# Patient Record
Sex: FEMALE | Race: WHITE | NOT HISPANIC OR LATINO
[De-identification: names, ages, dates, MRNs, and addresses within clinical notes are randomized per-mention and may not be internally consistent; named-entity substitution may affect disease eponyms.]

---

## 2019-10-23 ENCOUNTER — APPOINTMENT (OUTPATIENT)
Dept: RHEUMATOLOGY | Facility: CLINIC | Age: 59
End: 2019-10-23
Payer: COMMERCIAL

## 2019-10-23 VITALS
WEIGHT: 128 LBS | HEART RATE: 76 BPM | SYSTOLIC BLOOD PRESSURE: 139 MMHG | HEIGHT: 63 IN | OXYGEN SATURATION: 97 % | BODY MASS INDEX: 22.68 KG/M2 | DIASTOLIC BLOOD PRESSURE: 87 MMHG | TEMPERATURE: 98.8 F

## 2019-10-23 DIAGNOSIS — Z86.69 PERSONAL HISTORY OF OTHER DISEASES OF THE NERVOUS SYSTEM AND SENSE ORGANS: ICD-10-CM

## 2019-10-23 DIAGNOSIS — Z86.79 PERSONAL HISTORY OF OTHER DISEASES OF THE CIRCULATORY SYSTEM: ICD-10-CM

## 2019-10-23 DIAGNOSIS — Z78.9 OTHER SPECIFIED HEALTH STATUS: ICD-10-CM

## 2019-10-23 DIAGNOSIS — Z80.0 FAMILY HISTORY OF MALIGNANT NEOPLASM OF DIGESTIVE ORGANS: ICD-10-CM

## 2019-10-23 DIAGNOSIS — Z82.49 FAMILY HISTORY OF ISCHEMIC HEART DISEASE AND OTHER DISEASES OF THE CIRCULATORY SYSTEM: ICD-10-CM

## 2019-10-23 DIAGNOSIS — Z80.9 FAMILY HISTORY OF MALIGNANT NEOPLASM, UNSPECIFIED: ICD-10-CM

## 2019-10-23 PROCEDURE — 99213 OFFICE O/P EST LOW 20 MIN: CPT | Mod: 25

## 2019-10-23 PROCEDURE — 36415 COLL VENOUS BLD VENIPUNCTURE: CPT

## 2019-10-23 RX ORDER — ASPIRIN/ACETAMINOPHEN/CAFFEINE 500-325-65
POWDER IN PACKET (EA) ORAL
Refills: 0 | Status: ACTIVE | COMMUNITY

## 2019-10-23 RX ORDER — ATORVASTATIN CALCIUM 80 MG/1
TABLET, FILM COATED ORAL
Refills: 0 | Status: ACTIVE | COMMUNITY

## 2019-10-23 NOTE — HISTORY OF PRESENT ILLNESS
[FreeTextEntry1] : This is a 59-year-old woman she has a history of systemic lupus. She has had joint and skin problems and positive serology no major organ involvement. She's been taking hydroxychloroquine 200 mg every day for about 9 years she does see an eye doctor regularly she also has a history of borderline hyperparathyroidism she has osteoporosis on Boniva she's never had any fractures she is also taking vitamin D she is also on a statin she comes for routine followup there is a history of sepsis with toe amputation and finger amputation at one point. She is generally doing well. She denies any significant rashes she is again concerned about possible prominence of her forehead with some redness she previously tried some over-the-counter hydrocortisone she has not seen dermatology. She denies any joint pains she has had an upper respiratory infection with a residual cough she did take 10 days of antibiotics without benefit she had no shortness of breath no real wheezing no chest pain and no GI issues no nausea no vomiting.

## 2019-10-23 NOTE — REVIEW OF SYSTEMS
[Cough] : cough [Skin Lesions] : skin lesion [Fever] : no fever [Chills] : no chills [Feeling Poorly] : not feeling poorly [Feeling Tired] : not feeling tired [Eye Pain] : no eye pain [Dry Eyes] : no dryness of the eyes [Sore Throat] : no sore throat [Hoarseness] : no hoarseness [Chest Pain] : no chest pain [Palpitations] : no palpitations [Wheezing] : no wheezing [SOB on Exertion] : no shortness of breath during exertion [Shortness Of Breath] : no shortness of breath [Vomiting] : no vomiting [Abdominal Pain] : no abdominal pain [Constipation] : no constipation [Diarrhea] : no diarrhea [Arthralgias] : no arthralgias [Joint Pain] : no joint pain [Joint Stiffness] : no joint stiffness [Joint Swelling] : no joint swelling [Itching] : no itching [Easy Bruising] : no tendency for easy bruising [Easy Bleeding] : no tendency for easy bleeding [de-identified] : forehead / rash prominence

## 2019-10-23 NOTE — ASSESSMENT
[FreeTextEntry1] : This is a 59-year-old woman history of lupus currently doing very well on hydroxychloroquine. Am updating blood today no changes in medications.

## 2019-10-23 NOTE — PHYSICAL EXAM
[General Appearance - Alert] : alert [General Appearance - Well Nourished] : well nourished [General Appearance - Well Developed] : well developed [Sclera] : the sclera and conjunctiva were normal [Extraocular Movements] : extraocular movements were intact [PERRL With Normal Accommodation] : pupils were equal in size, round, and reactive to light [Outer Ear] : the ears and nose were normal in appearance [Examination Of The Oral Cavity] : the lips and gums were normal [Oropharynx] : the oropharynx was normal [Neck Cervical Mass (___cm)] : no neck mass was observed [Exaggerated Use Of Accessory Muscles For Inspiration] : no accessory muscle use [Auscultation Breath Sounds / Voice Sounds] : lungs were clear to auscultation bilaterally [Heart Rate And Rhythm] : heart rate was normal and rhythm regular [Heart Sounds] : normal S1 and S2 [Murmurs] : no murmurs [Edema] : there was no peripheral edema [Bowel Sounds] : normal bowel sounds [Abdomen Tenderness] : non-tender [Cervical Lymph Nodes Enlarged Posterior Bilaterally] : posterior cervical [Cervical Lymph Nodes Enlarged Anterior Bilaterally] : anterior cervical [Supraclavicular Lymph Nodes Enlarged Bilaterally] : supraclavicular [Musculoskeletal - Swelling] : no joint swelling seen [No Spinal Tenderness] : no spinal tenderness [Motor Tone] : muscle strength and tone were normal [] : no rash [Skin Lesions] : no skin lesions [No Focal Deficits] : no focal deficits [Motor Exam] : the motor exam was normal [FreeTextEntry1] : No tender or swollen joints no nodule

## 2019-10-24 LAB
ALBUMIN SERPL ELPH-MCNC: 4.5 G/DL
ALP BLD-CCNC: 77 U/L
ALT SERPL-CCNC: 30 U/L
ANION GAP SERPL CALC-SCNC: 14 MMOL/L
AST SERPL-CCNC: 33 U/L
BASOPHILS # BLD AUTO: 0.03 K/UL
BASOPHILS NFR BLD AUTO: 0.8 %
BILIRUB SERPL-MCNC: 0.4 MG/DL
BUN SERPL-MCNC: 27 MG/DL
C3 SERPL-MCNC: 136 MG/DL
C4 SERPL-MCNC: 28 MG/DL
CALCIUM SERPL-MCNC: 10.1 MG/DL
CHLORIDE SERPL-SCNC: 99 MMOL/L
CO2 SERPL-SCNC: 26 MMOL/L
CREAT SERPL-MCNC: 0.62 MG/DL
DSDNA AB SER-ACNC: <12 IU/ML
EOSINOPHIL # BLD AUTO: 0.11 K/UL
EOSINOPHIL NFR BLD AUTO: 2.8 %
GLUCOSE SERPL-MCNC: 87 MG/DL
HCT VFR BLD CALC: 44.7 %
HGB BLD-MCNC: 14.2 G/DL
IMM GRANULOCYTES NFR BLD AUTO: 0.3 %
LYMPHOCYTES # BLD AUTO: 0.95 K/UL
LYMPHOCYTES NFR BLD AUTO: 24.1 %
MAN DIFF?: NORMAL
MCHC RBC-ENTMCNC: 29.5 PG
MCHC RBC-ENTMCNC: 31.8 GM/DL
MCV RBC AUTO: 92.9 FL
MONOCYTES # BLD AUTO: 0.35 K/UL
MONOCYTES NFR BLD AUTO: 8.9 %
NEUTROPHILS # BLD AUTO: 2.5 K/UL
NEUTROPHILS NFR BLD AUTO: 63.1 %
PLATELET # BLD AUTO: 262 K/UL
POTASSIUM SERPL-SCNC: 4.8 MMOL/L
PROT SERPL-MCNC: 7.2 G/DL
RBC # BLD: 4.81 M/UL
RBC # FLD: 12.3 %
SODIUM SERPL-SCNC: 139 MMOL/L
WBC # FLD AUTO: 3.95 K/UL

## 2020-05-14 ENCOUNTER — APPOINTMENT (OUTPATIENT)
Dept: RHEUMATOLOGY | Facility: CLINIC | Age: 60
End: 2020-05-14
Payer: COMMERCIAL

## 2020-05-14 ENCOUNTER — APPOINTMENT (OUTPATIENT)
Dept: RHEUMATOLOGY | Facility: CLINIC | Age: 60
End: 2020-05-14

## 2020-05-14 PROCEDURE — 99442: CPT

## 2020-05-14 NOTE — ASSESSMENT
[FreeTextEntry1] : continue with HCQ and BONIVA - risks and benefits discussed - also discussed COVID -19 in detail - no changes in meds - continue eye follow up re: HCQ now on x 10 years no sx

## 2020-05-14 NOTE — REVIEW OF SYSTEMS
[Chest Pain] : chest pain [Palpitations] : palpitations [Fever] : no fever [Chills] : no chills [Feeling Poorly] : not feeling poorly [Feeling Tired] : not feeling tired [Shortness Of Breath] : no shortness of breath [Cough] : no cough [Abdominal Pain] : no abdominal pain [Vomiting] : no vomiting [Arthralgias] : no arthralgias [Joint Pain] : no joint pain [Joint Swelling] : no joint swelling [Joint Stiffness] : no joint stiffness [Skin Lesions] : no skin lesions [Itching] : no itching [FreeTextEntry3] : no dry eyes [FreeTextEntry4] : no dental issues / no dryness

## 2020-05-14 NOTE — REASON FOR VISIT
[Follow-Up: _____] : a [unfilled] follow-up visit [Verbal consent obtained from patient] : the patient, [unfilled] [FreeTextEntry1] : systemic lupus / osteoporosis

## 2020-05-14 NOTE — HISTORY OF PRESENT ILLNESS
[FreeTextEntry1] : h/o lupus on hCQ - doing well no pain in joints no rashes - still some forehead prominence - saw derm tried topivcala - no fevers . chill cough or edema no gi issues - no dry eyes no dry mouth - also h/o osteoporosis - on BONIVA - no dental issues no atypical thigh pains- last saw\par  EYE MD - in january - given amsler grid to monitor

## 2021-03-08 ENCOUNTER — NON-APPOINTMENT (OUTPATIENT)
Age: 61
End: 2021-03-08

## 2021-07-01 ENCOUNTER — NON-APPOINTMENT (OUTPATIENT)
Age: 61
End: 2021-07-01

## 2021-07-01 ENCOUNTER — APPOINTMENT (OUTPATIENT)
Dept: RHEUMATOLOGY | Facility: CLINIC | Age: 61
End: 2021-07-01
Payer: COMMERCIAL

## 2021-07-01 VITALS
HEART RATE: 72 BPM | DIASTOLIC BLOOD PRESSURE: 78 MMHG | SYSTOLIC BLOOD PRESSURE: 161 MMHG | WEIGHT: 130 LBS | BODY MASS INDEX: 23.04 KG/M2 | OXYGEN SATURATION: 98 % | TEMPERATURE: 97.8 F | HEIGHT: 63 IN

## 2021-07-01 PROCEDURE — 99213 OFFICE O/P EST LOW 20 MIN: CPT | Mod: 25

## 2021-07-01 NOTE — REVIEW OF SYSTEMS
[Feeling Poorly] : not feeling poorly [Dry Eyes] : no dryness of the eyes [Shortness Of Breath] : no shortness of breath [Wheezing] : no wheezing [Cough] : no cough [SOB on Exertion] : no shortness of breath during exertion [Arthralgias] : no arthralgias [Joint Pain] : no joint pain [Joint Swelling] : no joint swelling [Joint Stiffness] : no joint stiffness [Skin Lesions] : no skin lesions [Itching] : no itching

## 2021-07-01 NOTE — HISTORY OF PRESENT ILLNESS
[FreeTextEntry1] : This is a 60-year-old woman she is well-known to me she has longstanding systemic lupus she has had musculoskeletal issues and skin issues she remains on hydroxychloroquine she is currently taking 200 mg every other day she also has coexisting osteoporosis she uses Boniva she does use it intermittently she has not yet been on it for a full 3 years she again reports that she has had some swelling above her eyes she is actually been seen by a new dermatologist she has had use of topical steroids which seems to help this she is also taking vitamin D has been fully vaccinated she is otherwise well she continues to have neck and jaw issues which are unchanged she had no coughing or shortness of breath she has had no kidney disease she comes for update of bloods updated lupus markers previous markers have been reviewed and have been acceptable

## 2021-07-01 NOTE — ASSESSMENT
[FreeTextEntry1] : This is a 61-year-old woman she is well-known to me she has longstanding systemic lupus she is doing well she is on low-dose hydroxychloroquine 200 mg every other day she also intermittently takes Boniva for osteoporosis she has not quite completed a full 3 years she has been generally doing well she comes for update of bloods she has been fully vaccinated she has no evidence of disease activity I will be updating serum complements double-stranded DNA and routine bloods-bloods will be drawn in the office

## 2021-07-01 NOTE — PHYSICAL EXAM
[General Appearance - Alert] : alert [General Appearance - In No Acute Distress] : in no acute distress [General Appearance - Well Nourished] : well nourished [General Appearance - Well Developed] : well developed [Sclera] : the sclera and conjunctiva were normal [PERRL With Normal Accommodation] : pupils were equal in size, round, and reactive to light [Neck Appearance] : the appearance of the neck was normal [Neck Cervical Mass (___cm)] : no neck mass was observed [Thyroid Diffuse Enlargement] : the thyroid was not enlarged [Respiration, Rhythm And Depth] : normal respiratory rhythm and effort [Auscultation Breath Sounds / Voice Sounds] : lungs were clear to auscultation bilaterally [Edema] : there was no peripheral edema [Nail Clubbing] : no clubbing  or cyanosis of the fingernails [Abnormal Walk] : normal gait [Musculoskeletal - Swelling] : no joint swelling seen [Motor Tone] : muscle strength and tone were normal [Skin Color & Pigmentation] : normal skin color and pigmentation [Skin Turgor] : normal skin turgor [] : no rash [FreeTextEntry1] : No cervical adenopathy

## 2021-07-02 LAB
ALBUMIN SERPL ELPH-MCNC: 4.9 G/DL
ALP BLD-CCNC: 57 U/L
ALT SERPL-CCNC: 13 U/L
ANION GAP SERPL CALC-SCNC: 13 MMOL/L
AST SERPL-CCNC: 19 U/L
BASOPHILS # BLD AUTO: 0.02 K/UL
BASOPHILS NFR BLD AUTO: 0.5 %
BILIRUB SERPL-MCNC: 0.6 MG/DL
BUN SERPL-MCNC: 19 MG/DL
C3 SERPL-MCNC: 112 MG/DL
C4 SERPL-MCNC: 24 MG/DL
CALCIUM SERPL-MCNC: 9.7 MG/DL
CHLORIDE SERPL-SCNC: 103 MMOL/L
CO2 SERPL-SCNC: 26 MMOL/L
CREAT SERPL-MCNC: 0.7 MG/DL
EOSINOPHIL # BLD AUTO: 0.05 K/UL
EOSINOPHIL NFR BLD AUTO: 1.2 %
GLUCOSE SERPL-MCNC: 104 MG/DL
HCT VFR BLD CALC: 45.2 %
HGB BLD-MCNC: 14.6 G/DL
IMM GRANULOCYTES NFR BLD AUTO: 0.5 %
LYMPHOCYTES # BLD AUTO: 1.11 K/UL
LYMPHOCYTES NFR BLD AUTO: 26.9 %
MAN DIFF?: NORMAL
MCHC RBC-ENTMCNC: 30 PG
MCHC RBC-ENTMCNC: 32.3 GM/DL
MCV RBC AUTO: 93 FL
MONOCYTES # BLD AUTO: 0.3 K/UL
MONOCYTES NFR BLD AUTO: 7.3 %
NEUTROPHILS # BLD AUTO: 2.63 K/UL
NEUTROPHILS NFR BLD AUTO: 63.6 %
PLATELET # BLD AUTO: 172 K/UL
POTASSIUM SERPL-SCNC: 4.7 MMOL/L
PROT SERPL-MCNC: 7.2 G/DL
RBC # BLD: 4.86 M/UL
RBC # FLD: 12.7 %
SODIUM SERPL-SCNC: 142 MMOL/L
WBC # FLD AUTO: 4.13 K/UL

## 2021-07-05 ENCOUNTER — NON-APPOINTMENT (OUTPATIENT)
Age: 61
End: 2021-07-05

## 2021-07-06 LAB — DSDNA AB SER-ACNC: <12 IU/ML

## 2021-10-05 ENCOUNTER — NON-APPOINTMENT (OUTPATIENT)
Age: 61
End: 2021-10-05

## 2022-02-02 ENCOUNTER — APPOINTMENT (OUTPATIENT)
Dept: RHEUMATOLOGY | Facility: CLINIC | Age: 62
End: 2022-02-02
Payer: COMMERCIAL

## 2022-02-02 VITALS
WEIGHT: 151 LBS | TEMPERATURE: 98.4 F | BODY MASS INDEX: 26.75 KG/M2 | SYSTOLIC BLOOD PRESSURE: 152 MMHG | HEART RATE: 71 BPM | OXYGEN SATURATION: 100 % | HEIGHT: 63 IN | DIASTOLIC BLOOD PRESSURE: 82 MMHG

## 2022-02-02 PROCEDURE — 99213 OFFICE O/P EST LOW 20 MIN: CPT | Mod: 25

## 2022-02-02 NOTE — REVIEW OF SYSTEMS
[Feeling Poorly] : not feeling poorly [Feeling Tired] : not feeling tired [FreeTextEntry4] : She does report some bumps under her tongue [de-identified] : She does report some occasional dizziness

## 2022-02-02 NOTE — DATA REVIEWED
[FreeTextEntry1] : labs from november again these were reviewed normal DNA normal complements normal CBC and negative urinalysis but does have slight increase in ALT and AST of unclear significance

## 2022-02-02 NOTE — PHYSICAL EXAM
[General Appearance - Alert] : alert [General Appearance - In No Acute Distress] : in no acute distress [General Appearance - Well Nourished] : well nourished [General Appearance - Well Developed] : well developed [] : no respiratory distress [Exaggerated Use Of Accessory Muscles For Inspiration] : no accessory muscle use [Auscultation Breath Sounds / Voice Sounds] : lungs were clear to auscultation bilaterally [No Spinal Tenderness] : no spinal tenderness

## 2022-02-02 NOTE — HISTORY OF PRESENT ILLNESS
[FreeTextEntry1] : some mid/upper back pain x 1 [place] on and off this is a 61-year-old woman she had longstanding lupus she has been on hydroxychloroquine 200 mg a day she tolerates it well she comes with blood work done by her primary care physician these were done back in November her DNA was negative her complements were normal CBC was normal urinalysis was negative she did have slight increase in ALT and AST of unclear significance she is on a statin perhaps contributing she denies any significant muscle pains any joint pains or rashes no fevers or chills she does have some mid back pain for about a week but this is on and off not related to activity not related to breathing she has no coughing or shortness of breath she also has osteoporosis she has been taking Boniva but she has been taking it very intermittently she is only taken it for 1 year we have discussed with her a full 3 years she has seen me she is also seeing endocrinology  But has not been seen by endocrine in some time she is requesting a refill of her Boniva

## 2022-02-02 NOTE — ASSESSMENT
[FreeTextEntry1] : 61-year-old woman history of lupus on hydroxychloroquine as well as on a statin she has been off and on Boniva but is only completed approximately 1 year I again discussed completion of a full 3 years and given her new prescription I have suggested that we update her liver studies and also a CPK I am uncertain as to the significance of the slight increase in ALT and AST may be liver may be muscle may be related to the statin use but will be repeating it at first

## 2022-02-03 LAB
ALBUMIN SERPL ELPH-MCNC: 4.7 G/DL
ALP BLD-CCNC: 64 U/L
ALT SERPL-CCNC: 39 U/L
ANION GAP SERPL CALC-SCNC: 16 MMOL/L
AST SERPL-CCNC: 40 U/L
BILIRUB SERPL-MCNC: 0.5 MG/DL
BUN SERPL-MCNC: 21 MG/DL
CALCIUM SERPL-MCNC: 9.9 MG/DL
CHLORIDE SERPL-SCNC: 101 MMOL/L
CK SERPL-CCNC: 93 U/L
CO2 SERPL-SCNC: 26 MMOL/L
CREAT SERPL-MCNC: 0.7 MG/DL
GLUCOSE SERPL-MCNC: 100 MG/DL
POTASSIUM SERPL-SCNC: 4.9 MMOL/L
PROT SERPL-MCNC: 7.2 G/DL
SODIUM SERPL-SCNC: 142 MMOL/L

## 2022-02-11 ENCOUNTER — NON-APPOINTMENT (OUTPATIENT)
Age: 62
End: 2022-02-11

## 2022-03-25 ENCOUNTER — NON-APPOINTMENT (OUTPATIENT)
Age: 62
End: 2022-03-25

## 2022-10-14 ENCOUNTER — NON-APPOINTMENT (OUTPATIENT)
Age: 62
End: 2022-10-14

## 2022-11-10 ENCOUNTER — APPOINTMENT (OUTPATIENT)
Dept: RHEUMATOLOGY | Facility: CLINIC | Age: 62
End: 2022-11-10

## 2022-11-10 VITALS
OXYGEN SATURATION: 99 % | SYSTOLIC BLOOD PRESSURE: 144 MMHG | HEIGHT: 63 IN | TEMPERATURE: 98.1 F | WEIGHT: 134 LBS | DIASTOLIC BLOOD PRESSURE: 82 MMHG | HEART RATE: 66 BPM | BODY MASS INDEX: 23.74 KG/M2

## 2022-11-10 PROCEDURE — 99213 OFFICE O/P EST LOW 20 MIN: CPT

## 2022-11-10 NOTE — ASSESSMENT
[FreeTextEntry1] : This is a 62-year-old woman she does have a history of systemic lupus she was on hydroxychloroquine in the past she is now clinically off it and doing well she is going for follow-up with ophthalmology in February no clinical evidence of disease reassured regarding cracking joints she does have osteoporosis she remains on Boniva monthly

## 2022-11-10 NOTE — PHYSICAL EXAM
[General Appearance - Alert] : alert [General Appearance - In No Acute Distress] : in no acute distress [General Appearance - Well Nourished] : well nourished [General Appearance - Well Developed] : well developed [Abnormal Walk] : normal gait [Nail Clubbing] : no clubbing  or cyanosis of the fingernails

## 2022-11-10 NOTE — HISTORY OF PRESENT ILLNESS
[FreeTextEntry1] : She canh/o joint cracking - reassured - off HCQ since 03/22-this is a 62-year-old woman she has longstanding systemic lupus she been on hydroxychloroquine for many years although her ophthalmologist raise concerns back in March 2022 and she discontinued it she has now been off it for about 6 months as with recent labs which I have reviewed in the data section they are all normal complements and DNA are negative she otherwise feels well she does have some cracking joints which have reassured her regarding she also apparently had some left leg issues was found to have some peripheral vascular disease is on a statin and did see a vascular surgeon

## 2023-07-05 ENCOUNTER — NON-APPOINTMENT (OUTPATIENT)
Age: 63
End: 2023-07-05

## 2023-07-06 ENCOUNTER — APPOINTMENT (OUTPATIENT)
Dept: RHEUMATOLOGY | Facility: CLINIC | Age: 63
End: 2023-07-06
Payer: COMMERCIAL

## 2023-07-06 VITALS
RESPIRATION RATE: 16 BRPM | HEART RATE: 64 BPM | WEIGHT: 142 LBS | OXYGEN SATURATION: 98 % | HEIGHT: 61 IN | TEMPERATURE: 97.1 F | SYSTOLIC BLOOD PRESSURE: 169 MMHG | DIASTOLIC BLOOD PRESSURE: 81 MMHG | BODY MASS INDEX: 26.81 KG/M2

## 2023-07-06 LAB
25(OH)D3 SERPL-MCNC: 37.2 NG/ML
APPEARANCE: CLEAR
APTT BLD: 32.2 SEC
BACTERIA: NEGATIVE /HPF
BILIRUBIN URINE: NEGATIVE
BLOOD URINE: ABNORMAL
CAST: 0 /LPF
COLOR: YELLOW
CREAT SPEC-SCNC: 148 MG/DL
CREAT/PROT UR: 0.1 RATIO
EPITHELIAL CELLS: 1 /HPF
GLUCOSE QUALITATIVE U: NEGATIVE MG/DL
INR PPP: 1.08 RATIO
KETONES URINE: NEGATIVE MG/DL
LEUKOCYTE ESTERASE URINE: NEGATIVE
MICROSCOPIC-UA: NORMAL
NITRITE URINE: NEGATIVE
PH URINE: 6
PROT UR-MCNC: 12 MG/DL
PROTEIN URINE: NORMAL MG/DL
PT BLD: 12.5 SEC
RED BLOOD CELLS URINE: 4 /HPF
SPECIFIC GRAVITY URINE: 1.02
TSH SERPL-ACNC: 1.58 UIU/ML
UROBILINOGEN URINE: 0.2 MG/DL
WHITE BLOOD CELLS URINE: 0 /HPF

## 2023-07-06 PROCEDURE — 99215 OFFICE O/P EST HI 40 MIN: CPT | Mod: GC

## 2023-07-06 RX ORDER — HYDROXYCHLOROQUINE SULFATE 200 MG/1
200 TABLET, FILM COATED ORAL DAILY
Qty: 90 | Refills: 3 | Status: DISCONTINUED | COMMUNITY
End: 2023-07-06

## 2023-07-06 RX ORDER — IBANDRONATE SODIUM 150 MG/1
150 TABLET ORAL
Qty: 3 | Refills: 3 | Status: DISCONTINUED | COMMUNITY
Start: 2022-02-02 | End: 2023-07-06

## 2023-07-06 RX ORDER — IBANDRONATE SODIUM 150 MG/1
TABLET ORAL
Refills: 0 | Status: DISCONTINUED | COMMUNITY
End: 2023-07-06

## 2023-07-07 LAB
ALBUMIN SERPL ELPH-MCNC: 4.9 G/DL
ALP BLD-CCNC: 105 U/L
ALT SERPL-CCNC: 40 U/L
ANION GAP SERPL CALC-SCNC: 20 MMOL/L
AST SERPL-CCNC: 38 U/L
BILIRUB SERPL-MCNC: 0.6 MG/DL
BUN SERPL-MCNC: 18 MG/DL
CALCIUM SERPL-MCNC: 9.6 MG/DL
CHLORIDE SERPL-SCNC: 102 MMOL/L
CK SERPL-CCNC: 218 U/L
CO2 SERPL-SCNC: 20 MMOL/L
CONFIRM: 26.6 SEC
CREAT SERPL-MCNC: 0.67 MG/DL
DIRECT COOMBS: NORMAL
DRVVT IMM 1:2 NP PPP: NORMAL
DRVVT SCREEN TO CONFIRM RATIO: 0.86 RATIO
DSDNA AB SER-ACNC: <12 IU/ML
EGFR: 100 ML/MIN/1.73M2
POTASSIUM SERPL-SCNC: 4 MMOL/L
PROT SERPL-MCNC: 7.3 G/DL
SCREEN DRVVT: 26.8 SEC
SILICA CLOTTING TIME INTERPRETATION: NORMAL
SILICA CLOTTING TIME S/C: 0.75 RATIO
SODIUM SERPL-SCNC: 142 MMOL/L
THYROGLOB AB SERPL-ACNC: <20 IU/ML
THYROPEROXIDASE AB SERPL IA-ACNC: <10 IU/ML

## 2023-07-08 LAB
B2 GLYCOPROT1 IGA SERPL IA-ACNC: 6.8 SAU
B2 GLYCOPROT1 IGG SER-ACNC: <5 SGU
B2 GLYCOPROT1 IGM SER-ACNC: <5 SMU
C3 SERPL-MCNC: 140 MG/DL
C4 SERPL-MCNC: 27 MG/DL
CARDIOLIPIN IGM SER-MCNC: <5 GPL
CARDIOLIPIN IGM SER-MCNC: <5 MPL
DEPRECATED CARDIOLIPIN IGA SER: <5 APL

## 2023-07-09 LAB
ENA RNP AB SER IA-ACNC: 0.3 AL
ENA SM AB SER IA-ACNC: 0.2 AL
ENA SS-A AB SER IA-ACNC: <0.2 AL
ENA SS-B AB SER IA-ACNC: <0.2 AL

## 2023-07-10 LAB
ANA PAT FLD IF-IMP: ABNORMAL
ANA SER IF-ACNC: ABNORMAL

## 2023-07-11 ENCOUNTER — TRANSCRIPTION ENCOUNTER (OUTPATIENT)
Age: 63
End: 2023-07-11

## 2023-07-11 ENCOUNTER — APPOINTMENT (OUTPATIENT)
Dept: ENDOCRINOLOGY | Facility: CLINIC | Age: 63
End: 2023-07-11
Payer: COMMERCIAL

## 2023-07-11 VITALS — WEIGHT: 140 LBS | BODY MASS INDEX: 25.76 KG/M2 | HEIGHT: 61.8 IN

## 2023-07-11 LAB
PS IGA SER QL: <1
PS IGG SER QL: <9 UNITS
PS IGM SER QL: 15 UNITS

## 2023-07-11 PROCEDURE — 77080 DXA BONE DENSITY AXIAL: CPT

## 2023-07-13 LAB
PS-PROTHROM CMPLX IGG SERPL IA-ACNC: <9.4 U
PS-PROTHROM CMPLX IGM SERPL IA-ACNC: <9.4 U

## 2023-07-14 NOTE — REVIEW OF SYSTEMS
[Joint Pain] : joint pain [As Noted in HPI] : as noted in HPI [Negative] : Heme/Lymph [Eyesight Problems] : no eyesight problems

## 2023-07-14 NOTE — REASON FOR VISIT
[Initial Eval - Existing Diagnosis] : an initial evaluation of an existing diagnosis [Spouse] : spouse [FreeTextEntry1] : SLE

## 2023-07-14 NOTE — ASSESSMENT
[FreeTextEntry1] : 1. SLE: Onset of SLE, which included polyarthritis and rash, in 2004. She improved with steroids, and was treated  by a rheumatologist in [place]. She was subsequently followed by MONICA Rodriguez [phone] until his custodial. She had under his watch arthritis and skin disease that required a short course of steroids. Since that time, her SLE has been quiet, although she has noticed more hair loss with hair washing while off hydroxychloroquine. Hydroxychloroquine was stopped because of retinal abnormalities (specifics unknown). Lab tests in 2021 were notable for normal DNA, C', and creatinine. No arthritis, thrombosis, serositis.  \par 2. Osteoporosis: She has a history of osteoporosis treated intermittently with Boniva. No history of fracture. A DEXA in 2018 demonstrated the following T scores: L1-L4 T= -2.4; L FN T= -1.2; R FN T= -0.8; distal third L radius T =-3.0. She last took Boniva in 2023. BMD was not performed for several years. \par 3. 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 Possible hyperparathyroidism\par 5. OB history: no pregnancies \par 6. Family hx: colon cancer, breast cancer, gout\par \par Plan\par Lab tests\par Reviewed internal and/or external records of other providers\par Contact me\par She was instructed to investigate pneumococcal vaccine status\par BMD

## 2023-07-14 NOTE — HISTORY OF PRESENT ILLNESS
[FreeTextEntry1] : 1. SLE: Onset of SLE, which included polyarthritis and rash, in 2004. She improved with steroids, and was treated  by a rheumatologist in [place]. She was subsequently followed by MONICA Rodriguez [phone] until his skilled nursing. She had under his watch arthritis and skin disease that required a short course of steroids. Since that time, her SLE has been quiet, although she has noticed more hair loss with hair washing while off hydroxychloroquine. Hydroxychloroquine was stopped because of retinal abnormalities (specifics unknown). Lab tests in 2021 were notable for normal DNA, C', and creatinine. No arthritis, thrombosis, serositis.  \par 2. Osteoporosis: She has a history of osteoporosis treated intermittently with Boniva. No history of fracture. A DEXA in 2018 demonstrated the following T scores: L1-L4 T= -2.4; L FN T= -1.2; R FN T= -0.8; distal third L radius T =-3.0. She last took Boniva in 2023. BMD was not performed for several years. \par 3. 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 Possible hyperparathyroidism\par 5. OB history: no pregnancies \par 6. Family hx: colon cancer, breast cancer, gout\par \par Meds\par Lipitor 40 mg/d\par ASA 81 mg/d\par Vit D\par \par Vaccines\par [id number]  11/8/2018 \par \par

## 2023-07-14 NOTE — PHYSICAL EXAM
[General Appearance - Alert] : alert [General Appearance - In No Acute Distress] : in no acute distress [General Appearance - Well Nourished] : well nourished [Sclera] : the sclera and conjunctiva were normal [Outer Ear] : the ears and nose were normal in appearance [Examination Of The Oral Cavity] : the lips and gums were normal [Oropharynx] : the oropharynx was normal [Neck Appearance] : the appearance of the neck was normal [Respiration, Rhythm And Depth] : normal respiratory rhythm and effort [Exaggerated Use Of Accessory Muscles For Inspiration] : no accessory muscle use [Auscultation Breath Sounds / Voice Sounds] : lungs were clear to auscultation bilaterally [Heart Rate And Rhythm] : heart rate was normal and rhythm regular [Heart Sounds] : normal S1 and S2 [Edema] : there was no peripheral edema [] : no rash [No Focal Deficits] : no focal deficits [Impaired Insight] : insight and judgment were intact [Cervical Lymph Nodes Enlarged Posterior Bilaterally] : posterior cervical [Cervical Lymph Nodes Enlarged Anterior Bilaterally] : anterior cervical [Supraclavicular Lymph Nodes Enlarged Bilaterally] : supraclavicular [No CVA Tenderness] : no ~M costovertebral angle tenderness [No Spinal Tenderness] : no spinal tenderness [Sensation] : the sensory exam was normal to light touch and pinprick [Motor Exam] : the motor exam was normal [Oriented To Time, Place, And Person] : oriented to person, place, and time [FreeTextEntry1] : knee crepitus, L middle finger distal amputation

## 2023-09-05 ENCOUNTER — MED ADMIN CHARGE (OUTPATIENT)
Age: 63
End: 2023-09-05

## 2023-09-05 ENCOUNTER — APPOINTMENT (OUTPATIENT)
Dept: RHEUMATOLOGY | Facility: CLINIC | Age: 63
End: 2023-09-05
Payer: COMMERCIAL

## 2023-09-05 VITALS
OXYGEN SATURATION: 98 % | BODY MASS INDEX: 28.13 KG/M2 | DIASTOLIC BLOOD PRESSURE: 87 MMHG | HEART RATE: 71 BPM | SYSTOLIC BLOOD PRESSURE: 161 MMHG | WEIGHT: 149 LBS | HEIGHT: 61 IN

## 2023-09-05 PROCEDURE — 90677 PCV20 VACCINE IM: CPT

## 2023-09-05 PROCEDURE — G0009: CPT

## 2023-09-05 PROCEDURE — 99213 OFFICE O/P EST LOW 20 MIN: CPT | Mod: 25

## 2023-09-05 NOTE — HISTORY OF PRESENT ILLNESS
[FreeTextEntry1] : 1. SLE: Onset of SLE, which included polyarthritis and rash, in 2004. She improved with steroids, and was treated  by a rheumatologist in [place]. She was subsequently followed by MONICA Rodriguez [phone] until his shelter. She had under his watch arthritis and skin disease that required a short course of steroids. Since that time, her SLE has been quiet, although she has noticed more hair loss with hair washing while off hydroxychloroquine. Hydroxychloroquine was stopped because of retinal abnormalities (specifics unknown). Lab tests in 2021 were notable for normal DNA, C', and creatinine. No arthritis, thrombosis, serositis.   2. Osteoporosis: She has a history of osteoporosis treated intermittently with Boniva. No history of fracture. A DEXA in 2018 demonstrated the following T scores: L1-L4 T= -2.4; L FN T= -1.2; R FN T= -0.8; distal third L radius T =-3.0. She last took Boniva in 2023. BMD was not performed for several years. The result of the BMD in 2023 warranted treatment of osteoporosis in that her spine T score was -2.7, which represented worsening over a 5-year period. A discussion took place about the potential treatments (Prolia and zoledronic acid) along with their toxicities.  3. 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 Possible hyperparathyroidism 5. OB history: no pregnancies  6. Family hx: colon cancer, breast cancer, gout 7.  Hypertension: high at the Sep 2023 visit.  She was advised to check it at home.   Meds Lipitor 40 mg/d ASA 81 mg/d Vit D  Vaccines [id number] 11/8/2018  Prevnar 20  9/5/23  ([id number]; exp 9/2024)

## 2023-09-05 NOTE — ASSESSMENT
[FreeTextEntry1] : 1. SLE: Onset of SLE, which included polyarthritis and rash, in 2004. She improved with steroids, and was treated  by a rheumatologist in [place]. She was subsequently followed by MONICA Rodriguez [phone] until his half-way. She had under his watch arthritis and skin disease that required a short course of steroids. Since that time, her SLE has been quiet, although she has noticed more hair loss with hair washing while off hydroxychloroquine. Hydroxychloroquine was stopped because of retinal abnormalities (specifics unknown). Lab tests in 2021 were notable for normal DNA, C', and creatinine. No arthritis, thrombosis, serositis.   2. Osteoporosis: She has a history of osteoporosis treated intermittently with Boniva. No history of fracture. A DEXA in 2018 demonstrated the following T scores: L1-L4 T= -2.4; L FN T= -1.2; R FN T= -0.8; distal third L radius T =-3.0. She last took Boniva in 2023. BMD was not performed for several years. The result of the BMD in 2023 warranted treatment of osteoporosis in that her spine T score was -2.7, which represented worsening over a 5-year period. A discussion took place about the potential treatments (Prolia and zoledronic acid) along with their toxicities.  3. 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 Possible hyperparathyroidism 5. OB history: no pregnancies  6. Family hx: colon cancer, breast cancer, gout 7.  Hypertension: high at the Sep 2023 visit.  She was advised to check it at home.   Plan Lab tests reviewed Prevnar 20 IM right deltoid Contact me She was instructed to consider treatment of osteoporosis with either Prolia or zoledronic acid Take calcium and vit D Systemic Lupus Erythematosus, known as lupus, is a chronic autoimmune disease that can affect any organ in the body posing threats to proper organ function and even to life. Therefore, close surveillance of all bodily functions is required, including but not limited to central and peripheral nervous system, ocular and auditory systems, cardiopulmonary function, kidney function, mucocutaneous and musculoskeletal systems as well as constitutional manifestations. Surveillance consists of history, physical, and laboratory tests. Treatment varies, but most of the drugs used are high risk and therefore also require close monitoring in the form of blood and urine tests.

## 2023-09-05 NOTE — REVIEW OF SYSTEMS
[Eyesight Problems] : no eyesight problems [Joint Pain] : joint pain [As Noted in HPI] : as noted in HPI [Negative] : Heme/Lymph

## 2023-09-05 NOTE — HISTORY OF PRESENT ILLNESS
[FreeTextEntry1] : 1. SLE: Onset of SLE, which included polyarthritis and rash, in 2004. She improved with steroids, and was treated  by a rheumatologist in [place]. She was subsequently followed by MONICA Rodriguez [phone] until his [facility]. She had under his watch arthritis and skin disease that required a short course of steroids. Since that time, her SLE has been quiet, although she has noticed more hair loss with hair washing while off hydroxychloroquine. Hydroxychloroquine was stopped because of retinal abnormalities (specifics unknown). Lab tests in 2021 were notable for normal DNA, C', and creatinine. No arthritis, thrombosis, serositis.   2. Osteoporosis: She has a history of osteoporosis treated intermittently with Boniva. No history of fracture. A DEXA in 2018 demonstrated the following T scores: L1-L4 T= -2.4; L FN T= -1.2; R FN T= -0.8; distal third L radius T =-3.0. She last took Boniva in 2023. BMD was not performed for several years. The result of the BMD in 2023 warranted treatment of osteoporosis in that her spine T score was -2.7, which represented worsening over a 5-year period. A discussion took place about the potential treatments (Prolia and zoledronic acid) along with their toxicities.  3. 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 Possible hyperparathyroidism 5. OB history: no pregnancies  6. Family hx: colon cancer, breast cancer, gout 7.  Hypertension: high at the Sep 2023 visit.  She was advised to check it at home.   Meds Lipitor 40 mg/d ASA 81 mg/d Vit D  Vaccines [id number] 11/8/2018  Prevnar 20  9/5/23  ([id number]; exp 9/2024)

## 2023-09-05 NOTE — REASON FOR VISIT
Apixaban/Eliquis is used to treat and prevent blood clots. If you are not able to swallow the tablets whole, they may be crushed and mixed in water, apple juice, or applesauce and promptly taken within four hours. Never skip a dose of Apixaban/Eliquis. If you forget to take your Apixaban/Eliquis, take a dose as soon as you remember. If it is almost time for your next Apixaban/Eliquis dose, wait until then and take a regular dose. DO NOT take an extra pill to ‘catch up’.  NEVER TAKE A DOUBLE DOSE. Notify your doctor that you missed a dose. Take Apixaban/Eliquis at the same time each morning and evening. Apixaban/Eliquis may be taken with other medication or food. [Follow-Up: _____] : a [unfilled] follow-up visit

## 2023-09-05 NOTE — PHYSICAL EXAM
[General Appearance - Alert] : alert [General Appearance - In No Acute Distress] : in no acute distress [General Appearance - Well Nourished] : well nourished [Sclera] : the sclera and conjunctiva were normal [Outer Ear] : the ears and nose were normal in appearance [Examination Of The Oral Cavity] : the lips and gums were normal [Oropharynx] : the oropharynx was normal [Neck Appearance] : the appearance of the neck was normal [Respiration, Rhythm And Depth] : normal respiratory rhythm and effort [Exaggerated Use Of Accessory Muscles For Inspiration] : no accessory muscle use [Auscultation Breath Sounds / Voice Sounds] : lungs were clear to auscultation bilaterally [Heart Rate And Rhythm] : heart rate was normal and rhythm regular [Heart Sounds] : normal S1 and S2 [Edema] : there was no peripheral edema [Cervical Lymph Nodes Enlarged Posterior Bilaterally] : posterior cervical [Cervical Lymph Nodes Enlarged Anterior Bilaterally] : anterior cervical [Supraclavicular Lymph Nodes Enlarged Bilaterally] : supraclavicular [No CVA Tenderness] : no ~M costovertebral angle tenderness [No Spinal Tenderness] : no spinal tenderness [FreeTextEntry1] : knee crepitus, L middle finger distal amputation [] : no rash [Sensation] : the sensory exam was normal to light touch and pinprick [Motor Exam] : the motor exam was normal [No Focal Deficits] : no focal deficits [Oriented To Time, Place, And Person] : oriented to person, place, and time [Impaired Insight] : insight and judgment were intact

## 2023-09-22 ENCOUNTER — APPOINTMENT (OUTPATIENT)
Dept: OBGYN | Facility: CLINIC | Age: 63
End: 2023-09-22
Payer: COMMERCIAL

## 2023-09-22 VITALS
DIASTOLIC BLOOD PRESSURE: 84 MMHG | WEIGHT: 142 LBS | HEIGHT: 62 IN | BODY MASS INDEX: 26.13 KG/M2 | SYSTOLIC BLOOD PRESSURE: 138 MMHG | OXYGEN SATURATION: 100 % | HEART RATE: 79 BPM

## 2023-09-22 DIAGNOSIS — A63.0 ANOGENITAL (VENEREAL) WARTS: ICD-10-CM

## 2023-09-22 PROCEDURE — 99203 OFFICE O/P NEW LOW 30 MIN: CPT

## 2023-11-21 ENCOUNTER — APPOINTMENT (OUTPATIENT)
Dept: RHEUMATOLOGY | Facility: CLINIC | Age: 63
End: 2023-11-21
Payer: COMMERCIAL

## 2023-11-21 VITALS
SYSTOLIC BLOOD PRESSURE: 152 MMHG | HEART RATE: 71 BPM | TEMPERATURE: 97.1 F | BODY MASS INDEX: 26.5 KG/M2 | DIASTOLIC BLOOD PRESSURE: 81 MMHG | RESPIRATION RATE: 16 BRPM | HEIGHT: 62 IN | OXYGEN SATURATION: 99 % | WEIGHT: 144 LBS

## 2023-11-21 PROCEDURE — 99214 OFFICE O/P EST MOD 30 MIN: CPT

## 2023-11-22 LAB
ALBUMIN SERPL ELPH-MCNC: 4.9 G/DL
ALP BLD-CCNC: 90 U/L
ALT SERPL-CCNC: 38 U/L
ANION GAP SERPL CALC-SCNC: 13 MMOL/L
APPEARANCE: CLEAR
AST SERPL-CCNC: 36 U/L
BACTERIA: NEGATIVE /HPF
BASOPHILS # BLD AUTO: 0.04 K/UL
BASOPHILS NFR BLD AUTO: 0.8 %
BILIRUB SERPL-MCNC: 0.5 MG/DL
BILIRUBIN URINE: NEGATIVE
BLOOD URINE: ABNORMAL
BUN SERPL-MCNC: 24 MG/DL
C3 SERPL-MCNC: 136 MG/DL
C4 SERPL-MCNC: 28 MG/DL
CALCIUM SERPL-MCNC: 9.8 MG/DL
CAST: 1 /LPF
CHLORIDE SERPL-SCNC: 103 MMOL/L
CHOLEST SERPL-MCNC: 187 MG/DL
CK SERPL-CCNC: 128 U/L
CO2 SERPL-SCNC: 26 MMOL/L
COLOR: YELLOW
CREAT SERPL-MCNC: 0.69 MG/DL
CREAT SPEC-SCNC: 119 MG/DL
CREAT/PROT UR: 0.1 RATIO
EGFR: 99 ML/MIN/1.73M2
EOSINOPHIL # BLD AUTO: 0.09 K/UL
EOSINOPHIL NFR BLD AUTO: 1.7 %
EPITHELIAL CELLS: 0 /HPF
ESTIMATED AVERAGE GLUCOSE: 108 MG/DL
GLUCOSE QUALITATIVE U: NEGATIVE MG/DL
HBA1C MFR BLD HPLC: 5.4 %
HCT VFR BLD CALC: 43.9 %
HDLC SERPL-MCNC: 96 MG/DL
HGB BLD-MCNC: 14.3 G/DL
IMM GRANULOCYTES NFR BLD AUTO: 0.4 %
KETONES URINE: NEGATIVE MG/DL
LDLC SERPL CALC-MCNC: 78 MG/DL
LEUKOCYTE ESTERASE URINE: NEGATIVE
LYMPHOCYTES # BLD AUTO: 1.62 K/UL
LYMPHOCYTES NFR BLD AUTO: 31.5 %
MAN DIFF?: NORMAL
MCHC RBC-ENTMCNC: 30 PG
MCHC RBC-ENTMCNC: 32.6 GM/DL
MCV RBC AUTO: 92.2 FL
MICROSCOPIC-UA: NORMAL
MONOCYTES # BLD AUTO: 0.4 K/UL
MONOCYTES NFR BLD AUTO: 7.8 %
NEUTROPHILS # BLD AUTO: 2.98 K/UL
NEUTROPHILS NFR BLD AUTO: 57.8 %
NITRITE URINE: NEGATIVE
NONHDLC SERPL-MCNC: 91 MG/DL
PH URINE: 5.5
PLATELET # BLD AUTO: 214 K/UL
POTASSIUM SERPL-SCNC: 4.9 MMOL/L
PROT SERPL-MCNC: 7.4 G/DL
PROT UR-MCNC: 11 MG/DL
PROTEIN URINE: NEGATIVE MG/DL
RBC # BLD: 4.76 M/UL
RBC # FLD: 12.6 %
RED BLOOD CELLS URINE: 22 /HPF
REVIEW: NORMAL
SODIUM SERPL-SCNC: 143 MMOL/L
SPECIFIC GRAVITY URINE: 1.02
SQUAMOUS EPITHELIAL CELLS: PRESENT
TRIGL SERPL-MCNC: 69 MG/DL
UROBILINOGEN URINE: 0.2 MG/DL
WBC # FLD AUTO: 5.15 K/UL
WHITE BLOOD CELLS URINE: 5 /HPF

## 2023-11-23 LAB — DSDNA AB SER-ACNC: <12 IU/ML

## 2024-03-29 ENCOUNTER — APPOINTMENT (OUTPATIENT)
Dept: ULTRASOUND IMAGING | Facility: CLINIC | Age: 64
End: 2024-03-29

## 2024-03-29 PROCEDURE — 76856 US EXAM PELVIC COMPLETE: CPT | Mod: 59

## 2024-03-29 PROCEDURE — 76830 TRANSVAGINAL US NON-OB: CPT

## 2024-04-12 ENCOUNTER — OUTPATIENT (OUTPATIENT)
Dept: OUTPATIENT SERVICES | Facility: HOSPITAL | Age: 64
LOS: 1 days | End: 2024-04-12
Payer: COMMERCIAL

## 2024-04-12 VITALS
OXYGEN SATURATION: 98 % | HEIGHT: 63 IN | HEART RATE: 59 BPM | RESPIRATION RATE: 59 BRPM | TEMPERATURE: 98 F | DIASTOLIC BLOOD PRESSURE: 83 MMHG | SYSTOLIC BLOOD PRESSURE: 156 MMHG | WEIGHT: 147.93 LBS

## 2024-04-12 DIAGNOSIS — Z89.422 ACQUIRED ABSENCE OF OTHER LEFT TOE(S): Chronic | ICD-10-CM

## 2024-04-12 DIAGNOSIS — Z01.818 ENCOUNTER FOR OTHER PREPROCEDURAL EXAMINATION: ICD-10-CM

## 2024-04-12 DIAGNOSIS — Z89.421 ACQUIRED ABSENCE OF OTHER RIGHT TOE(S): Chronic | ICD-10-CM

## 2024-04-12 DIAGNOSIS — N84.0 POLYP OF CORPUS UTERI: ICD-10-CM

## 2024-04-12 DIAGNOSIS — Z98.890 OTHER SPECIFIED POSTPROCEDURAL STATES: Chronic | ICD-10-CM

## 2024-04-12 DIAGNOSIS — S68.119A COMPLETE TRAUMATIC METACARPOPHALANGEAL AMPUTATION OF UNSPECIFIED FINGER, INITIAL ENCOUNTER: Chronic | ICD-10-CM

## 2024-04-12 LAB
ANION GAP SERPL CALC-SCNC: 12 MMOL/L — SIGNIFICANT CHANGE UP (ref 5–17)
BUN SERPL-MCNC: 24 MG/DL — HIGH (ref 7–23)
CALCIUM SERPL-MCNC: 10.4 MG/DL — SIGNIFICANT CHANGE UP (ref 8.4–10.5)
CHLORIDE SERPL-SCNC: 101 MMOL/L — SIGNIFICANT CHANGE UP (ref 96–108)
CO2 SERPL-SCNC: 27 MMOL/L — SIGNIFICANT CHANGE UP (ref 22–31)
CREAT SERPL-MCNC: 0.7 MG/DL — SIGNIFICANT CHANGE UP (ref 0.5–1.3)
EGFR: 98 ML/MIN/1.73M2 — SIGNIFICANT CHANGE UP
GLUCOSE SERPL-MCNC: 84 MG/DL — SIGNIFICANT CHANGE UP (ref 70–99)
HCT VFR BLD CALC: 44.7 % — SIGNIFICANT CHANGE UP (ref 34.5–45)
HGB BLD-MCNC: 14.7 G/DL — SIGNIFICANT CHANGE UP (ref 11.5–15.5)
MCHC RBC-ENTMCNC: 29.3 PG — SIGNIFICANT CHANGE UP (ref 27–34)
MCHC RBC-ENTMCNC: 32.9 GM/DL — SIGNIFICANT CHANGE UP (ref 32–36)
MCV RBC AUTO: 89 FL — SIGNIFICANT CHANGE UP (ref 80–100)
NRBC # BLD: 0 /100 WBCS — SIGNIFICANT CHANGE UP (ref 0–0)
PLATELET # BLD AUTO: 206 K/UL — SIGNIFICANT CHANGE UP (ref 150–400)
POTASSIUM SERPL-MCNC: 4.6 MMOL/L — SIGNIFICANT CHANGE UP (ref 3.5–5.3)
POTASSIUM SERPL-SCNC: 4.6 MMOL/L — SIGNIFICANT CHANGE UP (ref 3.5–5.3)
RBC # BLD: 5.02 M/UL — SIGNIFICANT CHANGE UP (ref 3.8–5.2)
RBC # FLD: 12.3 % — SIGNIFICANT CHANGE UP (ref 10.3–14.5)
SODIUM SERPL-SCNC: 140 MMOL/L — SIGNIFICANT CHANGE UP (ref 135–145)
WBC # BLD: 4.53 K/UL — SIGNIFICANT CHANGE UP (ref 3.8–10.5)
WBC # FLD AUTO: 4.53 K/UL — SIGNIFICANT CHANGE UP (ref 3.8–10.5)

## 2024-04-12 PROCEDURE — 85027 COMPLETE CBC AUTOMATED: CPT

## 2024-04-12 PROCEDURE — G0463: CPT

## 2024-04-12 PROCEDURE — 36415 COLL VENOUS BLD VENIPUNCTURE: CPT

## 2024-04-12 PROCEDURE — 80048 BASIC METABOLIC PNL TOTAL CA: CPT

## 2024-04-12 RX ORDER — SODIUM CHLORIDE 9 MG/ML
3 INJECTION INTRAMUSCULAR; INTRAVENOUS; SUBCUTANEOUS EVERY 8 HOURS
Refills: 0 | Status: DISCONTINUED | OUTPATIENT
Start: 2024-04-22 | End: 2024-05-06

## 2024-04-12 RX ORDER — ATORVASTATIN CALCIUM 80 MG/1
1 TABLET, FILM COATED ORAL
Refills: 0 | DISCHARGE

## 2024-04-12 RX ORDER — ASPIRIN/CALCIUM CARB/MAGNESIUM 324 MG
1 TABLET ORAL
Refills: 0 | DISCHARGE

## 2024-04-12 RX ORDER — LIDOCAINE HCL 20 MG/ML
0.2 VIAL (ML) INJECTION ONCE
Refills: 0 | Status: DISCONTINUED | OUTPATIENT
Start: 2024-04-22 | End: 2024-05-06

## 2024-04-12 RX ORDER — SODIUM CHLORIDE 9 MG/ML
1000 INJECTION, SOLUTION INTRAVENOUS
Refills: 0 | Status: DISCONTINUED | OUTPATIENT
Start: 2024-04-22 | End: 2024-05-06

## 2024-04-12 NOTE — H&P PST ADULT - ASSESSMENT
Activity: Patient states she can walk 2-3 blocks and up a flight of stairs with no symptoms.     DASI: 7.25    Mallampati: 3    Dental: Denies loose teeth or dentures. Permanent bridge in place.

## 2024-04-12 NOTE — H&P PST ADULT - NSICDXPASTSURGICALHX_GEN_ALL_CORE_FT
PAST SURGICAL HISTORY:  Amputation of finger tip     H/O left breast biopsy     H/O local excision of skin lesion     History of partial amputation of toe of left foot     Status post amputation of toe of right foot

## 2024-04-12 NOTE — H&P PST ADULT - ATTENDING COMMENTS
62yo P0 hx TSS w pelvic pressure and ?pmb, endo 5mm w possible polyps.  plan for hysteroscopy sono guided

## 2024-04-12 NOTE — H&P PST ADULT - NSICDXPASTMEDICALHX_GEN_ALL_CORE_FT
PAST MEDICAL HISTORY:  Bradyarrhythmia     Genital warts     H/O gangrene     H/O severe sepsis     Heart murmur     HLD (hyperlipidemia)     HTN (hypertension)     Hyperparathyroidism     Lupus     Macular degeneration     WILBUR (obstructive sleep apnea)     Osteoporosis     PAD (peripheral artery disease)     Thyroid cyst     Uterine polyp

## 2024-04-12 NOTE — H&P PST ADULT - HISTORY OF PRESENT ILLNESS
63 year old female with PMH of Lupus, HTN, HLD, osteoporosis, bradyarrhythmia (follows with cardiologist regularly, saw him for clearance on 4/11/24), Hyperparathyroidism, genital warts s/p excision 2013, 2003 Toxic Shock Syndrome/Sepsis, Gangrene to her finger and toes post sepsis- with amputation of finger and toes 2003, PAD, macular degeneration, WILBUR- no treatment at this time. Patient states that she was having trace amounts of blood when wiping and pressure in her abdomen. She was found to have uterine polyp. She presents today to PST prior to scheduled D&C, Operative Hysteroscopy with Kayode, Ultrasound Guidance on 4/22/24. Patient denies recent fever, chills, chest pain, SOB, palpitations, or recent exposure to COVID-19.

## 2024-04-12 NOTE — H&P PST ADULT - PROBLEM SELECTOR PLAN 1
D&C, Operative Hysteroscopy with Aveta, Ultrasound Guidance on 4/22/24.  CBC and BMP done today at [facility].   Pre op instructions provided and all questions answered.

## 2024-04-12 NOTE — H&P PST ADULT - RX
no treatment at this time- states she needs to follow up with inpatient sleep study (had home study)

## 2024-04-17 ENCOUNTER — NON-APPOINTMENT (OUTPATIENT)
Age: 64
End: 2024-04-17

## 2024-04-18 ENCOUNTER — NON-APPOINTMENT (OUTPATIENT)
Age: 64
End: 2024-04-18

## 2024-04-21 ENCOUNTER — TRANSCRIPTION ENCOUNTER (OUTPATIENT)
Age: 64
End: 2024-04-21

## 2024-04-22 ENCOUNTER — TRANSCRIPTION ENCOUNTER (OUTPATIENT)
Age: 64
End: 2024-04-22

## 2024-04-22 ENCOUNTER — OUTPATIENT (OUTPATIENT)
Dept: OUTPATIENT SERVICES | Facility: HOSPITAL | Age: 64
LOS: 1 days | End: 2024-04-22
Payer: COMMERCIAL

## 2024-04-22 VITALS
OXYGEN SATURATION: 99 % | HEART RATE: 62 BPM | SYSTOLIC BLOOD PRESSURE: 136 MMHG | TEMPERATURE: 97 F | DIASTOLIC BLOOD PRESSURE: 54 MMHG | RESPIRATION RATE: 14 BRPM

## 2024-04-22 VITALS
DIASTOLIC BLOOD PRESSURE: 87 MMHG | TEMPERATURE: 98 F | HEIGHT: 63 IN | RESPIRATION RATE: 18 BRPM | OXYGEN SATURATION: 99 % | WEIGHT: 147.93 LBS | HEART RATE: 84 BPM | SYSTOLIC BLOOD PRESSURE: 151 MMHG

## 2024-04-22 DIAGNOSIS — Z98.890 OTHER SPECIFIED POSTPROCEDURAL STATES: Chronic | ICD-10-CM

## 2024-04-22 DIAGNOSIS — N84.0 POLYP OF CORPUS UTERI: ICD-10-CM

## 2024-04-22 DIAGNOSIS — Z89.421 ACQUIRED ABSENCE OF OTHER RIGHT TOE(S): Chronic | ICD-10-CM

## 2024-04-22 DIAGNOSIS — Z89.422 ACQUIRED ABSENCE OF OTHER LEFT TOE(S): Chronic | ICD-10-CM

## 2024-04-22 DIAGNOSIS — S68.119A COMPLETE TRAUMATIC METACARPOPHALANGEAL AMPUTATION OF UNSPECIFIED FINGER, INITIAL ENCOUNTER: Chronic | ICD-10-CM

## 2024-04-22 PROCEDURE — 58558 HYSTEROSCOPY BIOPSY: CPT

## 2024-04-22 PROCEDURE — C1782: CPT

## 2024-04-22 PROCEDURE — 88305 TISSUE EXAM BY PATHOLOGIST: CPT

## 2024-04-22 PROCEDURE — 88305 TISSUE EXAM BY PATHOLOGIST: CPT | Mod: 26

## 2024-04-22 PROCEDURE — 76998 US GUIDE INTRAOP: CPT

## 2024-04-22 DEVICE — AVETA SMOL RESECTING DEVICE 2.9MM: Type: IMPLANTABLE DEVICE | Status: FUNCTIONAL

## 2024-04-22 RX ORDER — ONDANSETRON 8 MG/1
4 TABLET, FILM COATED ORAL ONCE
Refills: 0 | Status: DISCONTINUED | OUTPATIENT
Start: 2024-04-22 | End: 2024-05-06

## 2024-04-22 RX ORDER — OXYCODONE HYDROCHLORIDE 5 MG/1
5 TABLET ORAL ONCE
Refills: 0 | Status: DISCONTINUED | OUTPATIENT
Start: 2024-04-22 | End: 2024-04-22

## 2024-04-22 RX ORDER — HYDROMORPHONE HYDROCHLORIDE 2 MG/ML
0.5 INJECTION INTRAMUSCULAR; INTRAVENOUS; SUBCUTANEOUS ONCE
Refills: 0 | Status: DISCONTINUED | OUTPATIENT
Start: 2024-04-22 | End: 2024-04-22

## 2024-04-22 RX ORDER — ACETAMINOPHEN 500 MG
3 TABLET ORAL
Qty: 0 | Refills: 0 | DISCHARGE

## 2024-04-22 RX ADMIN — SODIUM CHLORIDE 100 MILLILITER(S): 9 INJECTION, SOLUTION INTRAVENOUS at 08:37

## 2024-04-22 NOTE — ASU DISCHARGE PLAN (ADULT/PEDIATRIC) - PROCEDURE
dilation and curettage, operative hysteroscopy under ultrasound guidance dilation and curettage, diagnostic hysteroscopy under ultrasound guidance

## 2024-04-22 NOTE — PRE-ANESTHESIA EVALUATION ADULT - NSANTHPMHFT_GEN_ALL_CORE
seen by EP for syncope, attributed to dehydration   since hydrating, she has not had any further episodes

## 2024-04-22 NOTE — ASU PATIENT PROFILE, ADULT - FALL HARM RISK - UNIVERSAL INTERVENTIONS
Bed in lowest position, wheels locked, appropriate side rails in place/Call bell, personal items and telephone in reach/Instruct patient to call for assistance before getting out of bed or chair/Non-slip footwear when patient is out of bed/[place] to call system/Physically safe environment - no spills, clutter or unnecessary equipment/Purposeful Proactive Rounding/Room/bathroom lighting operational, light cord in reach

## 2024-04-22 NOTE — BRIEF OPERATIVE NOTE - OPERATION/FINDINGS
EUA: grossly normal appearing external genitalia, cervix, vagina. atv ut, 3-4 wk size.  Hysteroscopy: bilateral ostia visualized, wnl. No polyps noted. Scar tissue/adhesion noted in right cornea near fundus. Otherwise grossl normal appearing uterine cavity and atrophic endometrium.  Ultrasound: atv uterus, no abnl findings

## 2024-04-22 NOTE — PRE-ANESTHESIA EVALUATION ADULT - NSDENTALSD_ENT_ALL_CORE
minimally loose tooth upper right, missing and chipped teeth, perm bridge/caps/bridge/implants/loose teeth

## 2024-04-22 NOTE — ASU DISCHARGE PLAN (ADULT/PEDIATRIC) - NS MD DC FALL RISK RISK
For information on Fall & Injury Prevention, visit: [email or web address] OR  [email or web address] OR  https://www.cdc.gov/steadi/patient.html

## 2024-04-22 NOTE — ASU DISCHARGE PLAN (ADULT/PEDIATRIC) - CARE PROVIDER_API CALL
Wong Danielle  Obstetrics and Gynecology  [address]  [place] [address]  Phone: [phone]  Fax: [phone]  Follow Up Time: 2 weeks

## 2024-04-22 NOTE — ASU DISCHARGE PLAN (ADULT/PEDIATRIC) - NURSING INSTRUCTIONS
If needed you can take Tylenol 650mg-1000mg again at 3pm for pain. Please do not exceed more then 4000mg in 24 hours. You can also alternate with Ibuprofen 600mg and take if every 6 hours, 3 hours apart from Tylenol with food. The next time you can take Ibuprofen is as soon as you get home.

## 2024-04-24 LAB — SURGICAL PATHOLOGY STUDY: SIGNIFICANT CHANGE UP

## 2024-05-01 PROBLEM — E04.1 NONTOXIC SINGLE THYROID NODULE: Chronic | Status: ACTIVE | Noted: 2024-04-12

## 2024-05-01 PROBLEM — G47.33 OBSTRUCTIVE SLEEP APNEA (ADULT) (PEDIATRIC): Chronic | Status: ACTIVE | Noted: 2024-04-12

## 2024-05-01 PROBLEM — M81.0 AGE-RELATED OSTEOPOROSIS WITHOUT CURRENT PATHOLOGICAL FRACTURE: Chronic | Status: ACTIVE | Noted: 2024-04-12

## 2024-05-01 PROBLEM — I49.8 OTHER SPECIFIED CARDIAC ARRHYTHMIAS: Chronic | Status: ACTIVE | Noted: 2024-04-12

## 2024-05-01 PROBLEM — E78.5 HYPERLIPIDEMIA, UNSPECIFIED: Chronic | Status: ACTIVE | Noted: 2024-04-12

## 2024-05-01 PROBLEM — R01.1 CARDIAC MURMUR, UNSPECIFIED: Chronic | Status: ACTIVE | Noted: 2024-04-12

## 2024-05-01 PROBLEM — M32.9 SYSTEMIC LUPUS ERYTHEMATOSUS, UNSPECIFIED: Chronic | Status: ACTIVE | Noted: 2024-04-12

## 2024-05-01 PROBLEM — N84.0 POLYP OF CORPUS UTERI: Chronic | Status: ACTIVE | Noted: 2024-04-12

## 2024-05-01 PROBLEM — H35.30 UNSPECIFIED MACULAR DEGENERATION: Chronic | Status: ACTIVE | Noted: 2024-04-12

## 2024-05-01 PROBLEM — Z86.79 PERSONAL HISTORY OF OTHER DISEASES OF THE CIRCULATORY SYSTEM: Chronic | Status: ACTIVE | Noted: 2024-04-12

## 2024-05-01 PROBLEM — I73.9 PERIPHERAL VASCULAR DISEASE, UNSPECIFIED: Chronic | Status: ACTIVE | Noted: 2024-04-12

## 2024-05-01 PROBLEM — E21.3 HYPERPARATHYROIDISM, UNSPECIFIED: Chronic | Status: ACTIVE | Noted: 2024-04-12

## 2024-05-01 PROBLEM — I10 ESSENTIAL (PRIMARY) HYPERTENSION: Chronic | Status: ACTIVE | Noted: 2024-04-12

## 2024-05-01 PROBLEM — A63.0 ANOGENITAL (VENEREAL) WARTS: Chronic | Status: ACTIVE | Noted: 2024-04-12

## 2024-05-01 PROBLEM — Z86.19 PERSONAL HISTORY OF OTHER INFECTIOUS AND PARASITIC DISEASES: Chronic | Status: ACTIVE | Noted: 2024-04-12

## 2024-05-28 ENCOUNTER — APPOINTMENT (OUTPATIENT)
Dept: ENDOCRINOLOGY | Facility: CLINIC | Age: 64
End: 2024-05-28

## 2024-06-10 ENCOUNTER — APPOINTMENT (OUTPATIENT)
Dept: ENDOCRINOLOGY | Facility: CLINIC | Age: 64
End: 2024-06-10

## 2024-06-20 ENCOUNTER — APPOINTMENT (OUTPATIENT)
Dept: PODIATRY | Facility: CLINIC | Age: 64
End: 2024-06-20
Payer: COMMERCIAL

## 2024-06-20 DIAGNOSIS — M32.9 SYSTEMIC LUPUS ERYTHEMATOSUS, UNSPECIFIED: ICD-10-CM

## 2024-06-20 DIAGNOSIS — M81.0 AGE-RELATED OSTEOPOROSIS W/OUT CURRENT PATHOLOGICAL FRACTURE: ICD-10-CM

## 2024-06-20 DIAGNOSIS — M79.675 PAIN IN RIGHT TOE(S): ICD-10-CM

## 2024-06-20 DIAGNOSIS — I10 ESSENTIAL (PRIMARY) HYPERTENSION: ICD-10-CM

## 2024-06-20 DIAGNOSIS — S98.222A: ICD-10-CM

## 2024-06-20 DIAGNOSIS — I70.90 UNSPECIFIED ATHEROSCLEROSIS: ICD-10-CM

## 2024-06-20 DIAGNOSIS — S98.221A: ICD-10-CM

## 2024-06-20 DIAGNOSIS — M79.674 PAIN IN RIGHT TOE(S): ICD-10-CM

## 2024-06-20 PROCEDURE — 11721 DEBRIDE NAIL 6 OR MORE: CPT

## 2024-06-20 PROCEDURE — 99203 OFFICE O/P NEW LOW 30 MIN: CPT | Mod: 25

## 2024-06-21 PROBLEM — M81.0 OSTEOPOROSIS, UNSPECIFIED OSTEOPOROSIS TYPE, UNSPECIFIED PATHOLOGICAL FRACTURE PRESENCE: Status: ACTIVE | Noted: 2019-10-23

## 2024-06-21 PROBLEM — S98.221A: Status: ACTIVE | Noted: 2024-06-21

## 2024-06-21 PROBLEM — S98.222A: Status: ACTIVE | Noted: 2024-06-21

## 2024-06-21 PROBLEM — M79.674 PAIN IN TOES OF BOTH FEET: Status: ACTIVE | Noted: 2024-06-21

## 2024-06-23 NOTE — CONSULT LETTER
[Dear  ___] : Dear  [unfilled], [Courtesy Letter:] : I had the pleasure of seeing your patient, [unfilled], in my office today. [Please see my note below.] : Please see my note below. [Consult Closing:] : Thank you very much for allowing me to participate in the care of this patient.  If you have any questions, please do not hesitate to contact me. [Sincerely,] : Sincerely, [FreeTextEntry2] : Faustino Romero,  [address] [place], [place] [id number]  [FreeTextEntry3] : Charles M. Lombardi, DPM, FACFAS Systems Chief, Podiatric Services [facility] Assistant Professor of Surgery [facility] [employer] at [facility]

## 2024-06-23 NOTE — PHYSICAL EXAM
[General Appearance - Alert] : alert [1+] : left foot dorsalis pedis 1+ [Oriented To Time, Place, And Person] : oriented to person, place, and time [FreeTextEntry1] : Thickened mycotic nails x 10 with underlying debris.  Only partial portions of the nails are present, causing a significant amount of hyperkeratoses at the distal portion of the toes.

## 2024-06-23 NOTE — HISTORY OF PRESENT ILLNESS
[FreeTextEntry1] : Patient presents today with a chief complaint of pain at the tip of the left 3rd toe as well as some of the other toes.  She has a history or arterial sclerosis as well as a history of a Streptococcal infection causing gangrene to the tips of multiple toes.  She has multiple distal amputations of multiple digits of both feet.  At the tips of the digits, there are deformed nail plates as well as significant hyperkeratotic buildup.  Patient has difficulty ambulating due to pain and discomfort.

## 2024-06-23 NOTE — ASSESSMENT
[FreeTextEntry1] : Impression: Multiple amputations, bilateral feet (S98.221A, S98.222A).  SLE (M32.9).  Pain, tips of toes M79.674).  Treatment: All thickened nails were debrided and ground thin both manually and mechanically.   Hyperkeratoses were debrided.  Patient was given home care instructions.  Any questions or problems, patient is to contact the office.

## 2024-08-05 ENCOUNTER — APPOINTMENT (OUTPATIENT)
Dept: RHEUMATOLOGY | Facility: CLINIC | Age: 64
End: 2024-08-05

## 2024-08-05 ENCOUNTER — NON-APPOINTMENT (OUTPATIENT)
Age: 64
End: 2024-08-05

## 2024-08-05 PROCEDURE — 99214 OFFICE O/P EST MOD 30 MIN: CPT

## 2024-08-05 NOTE — HISTORY OF PRESENT ILLNESS
[FreeTextEntry1] : 1. SLE: Onset of SLE, which included polyarthritis and rash, in 2004. She improved with steroids, and was treated  by a rheumatologist in [place]. She was subsequently followed by MONICA Rodriguez [phone] until his snf. She had under his watch arthritis and skin disease that required a short course of steroids. Since that time, her SLE has been quiet, although she has noticed more hair loss with hair washing while off hydroxychloroquine. Hydroxychloroquine was stopped because of retinal abnormalities (specifics unknown). Lab tests in 2021 were notable for normal DNA, C', and creatinine. No arthritis, thrombosis, serositis.   2. Osteoporosis: She has a history of osteoporosis treated intermittently with Boniva. No history of fracture. A DEXA in 2018 demonstrated the following T scores: L1-L4 T= -2.4; L FN T= -1.2; R FN T= -0.8; distal third L radius T =-3.0. She last took Boniva in 2023. BMD was not performed for several years. The result of the BMD in 2023 warranted treatment of osteoporosis in that her spine T score was -2.7, which represented worsening over a 5-year period. A discussion took place about the potential treatments (Prolia and zoledronic acid) along with their toxicities. As of Nov 2023 she was still undecided.  She met with Endocrine regarding hyperparathyroidism.  3. 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 Possible hyperparathyroidism: followed by Endocrine 5. OB history: no pregnancies  6. Family hx: colon cancer, breast cancer, gout 7.  Hypertension: high at the Sep 2023 visit.  She was advised to check it at home.  8.  ? Syncope: in 2023, she had a couple of episodes where there might have been brief loss of consciousness. She was scheduled for an Echo and heart recording.  According to the patient, she was dehydrated and did better with hydration.  9.  Cardiac evaluation: stress test, ECHO, monitor, all tested in 2024.   Meds Lipitor 40 mg/d ASA 81 mg/d Vit D  Vaccines [id number] 11/8/2018  Prevnar 20  9/5/23  ([id number]; exp 9/2024)

## 2024-08-05 NOTE — ASSESSMENT
[FreeTextEntry1] : 1. SLE: Onset of SLE, which included polyarthritis and rash, in 2004. She improved with steroids, and was treated  by a rheumatologist in [place]. She was subsequently followed by MONICA Rodriguez [phone] until his [facility]. She had under his watch arthritis and skin disease that required a short course of steroids. Since that time, her SLE has been quiet, although she has noticed more hair loss with hair washing while off hydroxychloroquine. Hydroxychloroquine was stopped because of retinal abnormalities (specifics unknown). Lab tests in 2021 were notable for normal DNA, C', and creatinine. No arthritis, thrombosis, serositis.   2. Osteoporosis: She has a history of osteoporosis treated intermittently with Boniva. No history of fracture. A DEXA in 2018 demonstrated the following T scores: L1-L4 T= -2.4; L FN T= -1.2; R FN T= -0.8; distal third L radius T =-3.0. She last took Boniva in 2023. BMD was not performed for several years. The result of the BMD in 2023 warranted treatment of osteoporosis in that her spine T score was -2.7, which represented worsening over a 5-year period. A discussion took place about the potential treatments (Prolia and zoledronic acid) along with their toxicities. As of Nov 2023 she was still undecided.  3. 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 Possible hyperparathyroidism 5. OB history: no pregnancies  6. Family hx: colon cancer, breast cancer, gout 7.  Hypertension: high at the Sep 2023 visit.  She was advised to check it at home.  8.  ? Syncope: in 2023, she had a couple of episodes where there might have been brief loss of consciousness. She was scheduled for an Echo and heart recording.   Plan Lab tests Reviewed internal and/or external records of other providers Contact me She needs to decide  about the meds for osteoporosis Systemic Lupus Erythematosus, known as lupus, is a chronic autoimmune disease that can affect any organ in the body posing threats to proper organ function and even to life. Therefore, close surveillance of all bodily functions is required, including but not limited to central and peripheral nervous system, ocular and auditory systems, cardiopulmonary function, kidney function, mucocutaneous and musculoskeletal systems as well as constitutional manifestations. Surveillance consists of history, physical, and laboratory tests. Treatment varies, but most of the drugs used are high risk and therefore also require close monitoring in the form of blood and urine tests. Return 6 months

## 2024-08-05 NOTE — PHYSICAL EXAM
[General Appearance - Alert] : alert [General Appearance - In No Acute Distress] : in no acute distress [General Appearance - Well Nourished] : well nourished [Sclera] : the sclera and conjunctiva were normal [Outer Ear] : the ears and nose were normal in appearance [Examination Of The Oral Cavity] : the lips and gums were normal [Oropharynx] : the oropharynx was normal [Neck Appearance] : the appearance of the neck was normal [Respiration, Rhythm And Depth] : normal respiratory rhythm and effort [Exaggerated Use Of Accessory Muscles For Inspiration] : no accessory muscle use [Auscultation Breath Sounds / Voice Sounds] : lungs were clear to auscultation bilaterally [Heart Rate And Rhythm] : heart rate was normal and rhythm regular [Heart Sounds] : normal S1 and S2 [Edema] : there was no peripheral edema [Abdomen Soft] : soft [Abdomen Tenderness] : non-tender [Cervical Lymph Nodes Enlarged Posterior Bilaterally] : posterior cervical [Cervical Lymph Nodes Enlarged Anterior Bilaterally] : anterior cervical [Supraclavicular Lymph Nodes Enlarged Bilaterally] : supraclavicular [No CVA Tenderness] : no ~M costovertebral angle tenderness [No Spinal Tenderness] : no spinal tenderness [] : no rash [Sensation] : the sensory exam was normal to light touch and pinprick [Motor Exam] : the motor exam was normal [No Focal Deficits] : no focal deficits [Oriented To Time, Place, And Person] : oriented to person, place, and time [Impaired Insight] : insight and judgment were intact [Musculoskeletal - Swelling] : no joint swelling seen [FreeTextEntry1] : knee crepitus, L middle finger distal amputation

## 2025-02-18 ENCOUNTER — APPOINTMENT (OUTPATIENT)
Dept: RHEUMATOLOGY | Facility: CLINIC | Age: 65
End: 2025-02-18
Payer: COMMERCIAL

## 2025-02-18 VITALS
WEIGHT: 149 LBS | HEART RATE: 78 BPM | DIASTOLIC BLOOD PRESSURE: 88 MMHG | SYSTOLIC BLOOD PRESSURE: 154 MMHG | HEIGHT: 62 IN | OXYGEN SATURATION: 98 % | BODY MASS INDEX: 27.42 KG/M2 | RESPIRATION RATE: 16 BRPM

## 2025-02-18 DIAGNOSIS — M32.9 SYSTEMIC LUPUS ERYTHEMATOSUS, UNSPECIFIED: ICD-10-CM

## 2025-02-18 DIAGNOSIS — I10 ESSENTIAL (PRIMARY) HYPERTENSION: ICD-10-CM

## 2025-02-18 DIAGNOSIS — M81.0 AGE-RELATED OSTEOPOROSIS W/OUT CURRENT PATHOLOGICAL FRACTURE: ICD-10-CM

## 2025-02-18 PROCEDURE — 99214 OFFICE O/P EST MOD 30 MIN: CPT

## 2025-02-18 PROCEDURE — G2211 COMPLEX E/M VISIT ADD ON: CPT | Mod: NC

## 2025-02-19 LAB
25(OH)D3 SERPL-MCNC: 39.6 NG/ML
ALBUMIN SERPL ELPH-MCNC: 4.6 G/DL
ALP BLD-CCNC: 89 U/L
ALT SERPL-CCNC: 16 U/L
ANION GAP SERPL CALC-SCNC: 19 MMOL/L
APPEARANCE: CLEAR
AST SERPL-CCNC: 25 U/L
BACTERIA: NEGATIVE /HPF
BASOPHILS # BLD AUTO: 0.04 K/UL
BASOPHILS NFR BLD AUTO: 0.8 %
BILIRUB SERPL-MCNC: 0.6 MG/DL
BILIRUBIN URINE: NEGATIVE
BLOOD URINE: ABNORMAL
BUN SERPL-MCNC: 22 MG/DL
C3 SERPL-MCNC: 138 MG/DL
C4 SERPL-MCNC: 28 MG/DL
CALCIUM SERPL-MCNC: 9.7 MG/DL
CALCIUM SERPL-MCNC: 9.7 MG/DL
CAST: 0 /LPF
CHLORIDE SERPL-SCNC: 97 MMOL/L
CK SERPL-CCNC: 179 U/L
CO2 SERPL-SCNC: 22 MMOL/L
COLOR: YELLOW
CREAT SERPL-MCNC: 0.67 MG/DL
CREAT SPEC-SCNC: 162 MG/DL
CREAT/PROT UR: 0.1 RATIO
DSDNA AB SER-ACNC: 1 IU/ML
EGFR: 99 ML/MIN/1.73M2
ENA RNP AB SER IA-ACNC: 0.3 AL
ENA SM AB SER IA-ACNC: <0.2 AL
ENA SS-A AB SER IA-ACNC: <0.2 AL
ENA SS-B AB SER IA-ACNC: <0.2 AL
EOSINOPHIL # BLD AUTO: 0.08 K/UL
EOSINOPHIL NFR BLD AUTO: 1.5 %
EPITHELIAL CELLS: 1 /HPF
GLUCOSE QUALITATIVE U: NEGATIVE MG/DL
HCT VFR BLD CALC: 43.5 %
HGB BLD-MCNC: 13.8 G/DL
IMM GRANULOCYTES NFR BLD AUTO: 0.2 %
KETONES URINE: NEGATIVE MG/DL
LEUKOCYTE ESTERASE URINE: NEGATIVE
LYMPHOCYTES # BLD AUTO: 1.56 K/UL
LYMPHOCYTES NFR BLD AUTO: 29.7 %
MAN DIFF?: NORMAL
MCHC RBC-ENTMCNC: 29.1 PG
MCHC RBC-ENTMCNC: 31.7 G/DL
MCV RBC AUTO: 91.6 FL
MICROSCOPIC-UA: NORMAL
MONOCYTES # BLD AUTO: 0.43 K/UL
MONOCYTES NFR BLD AUTO: 8.2 %
NEUTROPHILS # BLD AUTO: 3.13 K/UL
NEUTROPHILS NFR BLD AUTO: 59.6 %
NITRITE URINE: NEGATIVE
PARATHYROID HORMONE INTACT: 51 PG/ML
PH URINE: 5
PLATELET # BLD AUTO: 223 K/UL
POTASSIUM SERPL-SCNC: 4 MMOL/L
PROT SERPL-MCNC: 7.5 G/DL
PROT UR-MCNC: 17 MG/DL
PROTEIN URINE: NEGATIVE MG/DL
RBC # BLD: 4.75 M/UL
RBC # FLD: 13.1 %
RED BLOOD CELLS URINE: 0 /HPF
REVIEW: NORMAL
SODIUM SERPL-SCNC: 138 MMOL/L
SPECIFIC GRAVITY URINE: 1.03
UROBILINOGEN URINE: 0.2 MG/DL
WBC # FLD AUTO: 5.25 K/UL
WHITE BLOOD CELLS URINE: 0 /HPF

## (undated) DEVICE — SOL IRR BAG NS 0.9% 3000ML

## (undated) DEVICE — GLV 6.5 PROTEXIS (WHITE)

## (undated) DEVICE — DRAPE 1/2 SHEET 40X57"

## (undated) DEVICE — TUBING STRYKER HYSTEROSCOPY INFLOW OUTFLOW

## (undated) DEVICE — SOL IRR GLYCINE 1.5% 3000L

## (undated) DEVICE — WARMING BLANKET UPPER ADULT

## (undated) DEVICE — POSITIONER PATIENT SAFETY STRAP 3X60"

## (undated) DEVICE — SOL IRR POUR NS 0.9% 500ML

## (undated) DEVICE — DRAPE LIGHT HANDLE COVER (GREEN)

## (undated) DEVICE — PACK LITHOTOMY

## (undated) DEVICE — ELCTR CUTTING LOOP 24FR 12 DEG 0.35 WIRE

## (undated) DEVICE — AVETA FLUID MANAGEMENT ACCESSORY

## (undated) DEVICE — AVETA CORAL HYSTEROSCOPE 4.6MM DISP